# Patient Record
Sex: MALE | URBAN - METROPOLITAN AREA
[De-identification: names, ages, dates, MRNs, and addresses within clinical notes are randomized per-mention and may not be internally consistent; named-entity substitution may affect disease eponyms.]

---

## 2021-09-29 ENCOUNTER — OFFICE VISIT (OUTPATIENT)
Dept: URBAN - METROPOLITAN AREA CLINIC 13 | Facility: CLINIC | Age: 56
End: 2021-09-29

## 2021-09-29 DIAGNOSIS — H25.13 AGE-RELATED NUCLEAR CATARACT, BILATERAL: ICD-10-CM

## 2021-09-29 DIAGNOSIS — E11.3513 TYPE 2 DIAB WITH PROLIF DIAB RTNOP WITH MACULAR EDEMA, BI: ICD-10-CM

## 2021-09-29 DIAGNOSIS — H43.11 VITREOUS HEMORRHAGE, RIGHT EYE: Primary | ICD-10-CM

## 2021-09-29 DIAGNOSIS — H20.041 SECONDARY NONINFECTIOUS IRIDOCYCLITIS, RIGHT EYE: ICD-10-CM

## 2021-09-29 PROCEDURE — 92014 COMPRE OPH EXAM EST PT 1/>: CPT | Performed by: OPHTHALMOLOGY

## 2021-09-29 PROCEDURE — 92134 CPTRZ OPH DX IMG PST SGM RTA: CPT | Performed by: OPHTHALMOLOGY

## 2021-09-29 ASSESSMENT — INTRAOCULAR PRESSURE
OD: 13
OS: 13

## 2021-09-29 NOTE — IMPRESSION/PLAN
Impression: Type 2 diab with prolif diab rtnop with macular edema, bi: L79.8241 Bilateral.
s/p PPV, PRP x VH/PDR OS 2/28/19
s/p PRP OD 12/23/15 Plan: Dense VH OD as above, stable regressed PDR OS without CSDME OS on exam/OCT. Emphasized importance of good blood sugar, blood pressure, and cholesterol control.

## 2021-09-29 NOTE — IMPRESSION/PLAN
Impression: Vitreous hemorrhage, right eye: H43.11. Plan: Dense VH OD x 3 months. Discussed treatment options (Avastin, PPV). Patient wishes to proceed with surgery. Needs to check on insurance eligibility. Will consent for surgery and patient will investigate payment methods. Would recommend Avastin injection prior to surgery if feasible.   

Plan: PPV, EL, x VH/PDR OD +/- Avastin injection 1 week prior to surgery

## 2021-10-20 ENCOUNTER — Encounter (OUTPATIENT)
Dept: URBAN - METROPOLITAN AREA EXTERNAL CLINIC 14 | Facility: EXTERNAL CLINIC | Age: 56
End: 2021-10-20

## 2021-10-20 PROCEDURE — 67040 LASER TREATMENT OF RETINA: CPT | Performed by: OPHTHALMOLOGY

## 2021-10-21 ENCOUNTER — POST-OPERATIVE VISIT (OUTPATIENT)
Dept: URBAN - METROPOLITAN AREA CLINIC 7 | Facility: CLINIC | Age: 56
End: 2021-10-21
Payer: COMMERCIAL

## 2021-10-21 PROCEDURE — 99024 POSTOP FOLLOW-UP VISIT: CPT | Performed by: OPHTHALMOLOGY

## 2021-10-21 ASSESSMENT — INTRAOCULAR PRESSURE
OS: 13
OD: 12

## 2021-10-21 NOTE — IMPRESSION/PLAN
Impression: S/P PPV, EL, x VH/PDR OD - 1 Day. Vitreous hemorrhage, right eye  H43.11. Plan: Retina attached. IOP satisfactory OU. No infection. Patient is doing well. Starting Prednisolone and Ofloxacin QID OD. Return in 1 week pos

## 2021-10-27 ENCOUNTER — POST-OPERATIVE VISIT (OUTPATIENT)
Dept: URBAN - METROPOLITAN AREA CLINIC 13 | Facility: CLINIC | Age: 56
End: 2021-10-27
Payer: COMMERCIAL

## 2021-10-27 PROCEDURE — 99024 POSTOP FOLLOW-UP VISIT: CPT | Performed by: OPHTHALMOLOGY

## 2021-10-27 ASSESSMENT — INTRAOCULAR PRESSURE
OS: 16
OD: 16

## 2021-10-27 NOTE — IMPRESSION/PLAN
Impression: S/P PPV, EL, x VH/PDR OD - 7 Days. Vitreous hemorrhage, right eye  H43.11. Plan: Postop VH resolving. Retina attached. IOP satisfactory OU. No infection. Patient is doing well. Patient will discontinue Ofloxacin and taper off Prednisolone weekly. 

Return in 2 months, OCT OU

## 2021-12-16 NOTE — IMPRESSION/PLAN
F25.0 Impression: Age-related nuclear cataract, bilateral: H25.13. Plan: NVS.  Observe. F25.0 F25.0 F25.0 F25.0 F25.0 F25.0 F25.0 F25.0 F25.0